# Patient Record
Sex: MALE | Race: WHITE | NOT HISPANIC OR LATINO
[De-identification: names, ages, dates, MRNs, and addresses within clinical notes are randomized per-mention and may not be internally consistent; named-entity substitution may affect disease eponyms.]

---

## 2023-04-28 ENCOUNTER — APPOINTMENT (OUTPATIENT)
Dept: ORTHOPEDIC SURGERY | Facility: CLINIC | Age: 38
End: 2023-04-28

## 2023-05-09 ENCOUNTER — APPOINTMENT (OUTPATIENT)
Dept: ORTHOPEDIC SURGERY | Facility: CLINIC | Age: 38
End: 2023-05-09
Payer: COMMERCIAL

## 2023-05-09 VITALS — HEIGHT: 65 IN | BODY MASS INDEX: 29.99 KG/M2 | WEIGHT: 180 LBS

## 2023-05-09 DIAGNOSIS — Z78.9 OTHER SPECIFIED HEALTH STATUS: ICD-10-CM

## 2023-05-09 PROCEDURE — 20611 DRAIN/INJ JOINT/BURSA W/US: CPT

## 2023-05-09 PROCEDURE — 99204 OFFICE O/P NEW MOD 45 MIN: CPT | Mod: 25

## 2023-05-09 PROCEDURE — 73564 X-RAY EXAM KNEE 4 OR MORE: CPT | Mod: RT

## 2023-05-09 NOTE — DISCUSSION/SUMMARY
[de-identified] : Reviewed all images with patient.\par CSI injections to the right knee was provided today.\par MRI of right knee to eval medial meniscus tear.\par Follow up after MRI scans.\par \par RB&A to corticosteroid injection discussed.\par All questions were answered.\par Patient wishes to move forward with injection today. \par \par Right Knee Ultrasound Guided aspiration/steroid injection procedure note:\par Patient Identification\par Name/: Verbal with patient and/or family\par  \par Procedure Verification:\par Procedure confirmed with patient or family/designee\par Consent for procedure: Verbal Consent Given\par Relevant documentation completed, reviewed, and signed\par Clinical indications for procedure confirmed\par \par Time-out with all members of procedure team immediately prior to procedure:\par Correct patient identified. Agreement on procedure. Correct side and site.\par \par KNEE INTRAARTICULAR INJECTION - RIGHT\par After verbal consent and identification of the correct patient and correct site, the RIGHT superolateral knee was prepped using alcohol swabs and betadine. This was allowed time to air dry.  A mixture of Kenalog,  Bupicacaine  was injected into the right knee using a sterile 22G 1.5 inch needle.  The patient tolerated the procedure well.  A sterile dressing was placed.  After-care instructions were provided and included instructions to ice the area and to call if redness, pain, or fever develop.\par \par -----------------------------------------------\par Home Exercise\par The patient is instructed on a home exercise program.\par \par FRANNY CERVANTES Acting as a Scribe for Dr. Arevalo\par I, Franny Cervantes, attest that this documentation has been prepared under the direction and in the presence of Provider Castro Arevalo MD.\par \par Activity Modification\par The patient was advised to modify their activities.\par \par Dx / Natural History\par The patient was advised of the diagnosis.  The natural history of the pathology was explained in full to the patient in layman's terms.  Several different treatment options were discussed and explained in full to the patient including the risks and benefits of both surgical and non-surgical treatments.  All questions and concerns were answered.\par \par Pain Guide Activities\par The patient was advised to let pain guide the gradual advancement of activities.\par \par RICE\par I explained to the patient that rest, ice, compression, and elevation would benefit them.  They may return to activity after follow-up or when they no longer have any pain.\par \par The patient's current medication management of their orthopedic diagnosis was reviewed today:\par (1) We discussed a comprehensive treatment plan that included possible pharmaceutical management involving the use of prescription strength medications including but not limited to options such as oral Naprosyn 500mg BID, once daily Meloxicam 15 mg, or 500-650 mg Tylenol versus over the counter oral medications and topical prescription NSAID Pennsaid vs over the counter Voltaren gel.\par (2) There is a moderate risk of morbidity with further treatment, especially from use of prescription strength medications and possible side effects of these medications which include upset stomach with oral medications, skin reactions to topical medications and cardiac/renal issues with long term use.\par (3) I recommended that the patient follow-up with their medical physician to discuss any significant specific potential issues with long term medication use such as interactions with current medications or with exacerbation of underlying medical comorbidities.\par (4) The benefits and risks associated with use of injectable, oral or topical, prescription and over the counter anti-inflammatory medications were discussed with the patient. The patient voiced understanding of the risks including but not limited to bleeding, stroke, kidney dysfunction, heart disease, and were referred to the black box warning label for further information. \par

## 2023-05-09 NOTE — PHYSICAL EXAM
[de-identified] : NVI distally\par \par Right Knee:\par Full ROM \par Stable \par no effusion\par Medial joint line tenderness \par

## 2023-05-09 NOTE — HISTORY OF PRESENT ILLNESS
[de-identified] : The patient is a 37 year old R hand dominant male who presents today complaining of right knee.\par Date of Injury/Onset: 1 month ago\par Pain: At Rest: 6-10/10 intermittently\par With Activity: 10/10 intermittently\par Mechanism of injury: no specific injury \par This is not a Work Related Injury being treated under Worker's Compensation.\par This is not an athletic injury occurring associated with an interscholastic or organized sports team.\par Quality of symptoms: aching, sharp\par Improves with: icyhot \par Worse with: driving, extended bending\par Prior treatment: citymd, prednisone, bioflex\par Prior Imaging: n/a\par Out of work/sport: _, since _\par School/Sport/Position/Occupation: [employer] \par Additional Information: None

## 2023-05-13 ENCOUNTER — APPOINTMENT (OUTPATIENT)
Dept: MRI IMAGING | Facility: CLINIC | Age: 38
End: 2023-05-13

## 2023-05-13 DIAGNOSIS — S83.91XA SPRAIN OF UNSPECIFIED SITE OF RIGHT KNEE, INITIAL ENCOUNTER: ICD-10-CM

## 2023-05-14 ENCOUNTER — FORM ENCOUNTER (OUTPATIENT)
Age: 38
End: 2023-05-14

## 2023-05-15 ENCOUNTER — APPOINTMENT (OUTPATIENT)
Dept: MRI IMAGING | Facility: CLINIC | Age: 38
End: 2023-05-15
Payer: COMMERCIAL

## 2023-05-15 PROCEDURE — 73721 MRI JNT OF LWR EXTRE W/O DYE: CPT | Mod: RT

## 2023-05-23 ENCOUNTER — APPOINTMENT (OUTPATIENT)
Dept: ORTHOPEDIC SURGERY | Facility: CLINIC | Age: 38
End: 2023-05-23
Payer: COMMERCIAL

## 2023-05-23 VITALS — BODY MASS INDEX: 29.99 KG/M2 | HEIGHT: 65 IN | WEIGHT: 180 LBS

## 2023-05-23 PROCEDURE — 99214 OFFICE O/P EST MOD 30 MIN: CPT

## 2023-05-23 NOTE — DATA REVIEWED
[FreeTextEntry1] : 05/09/23\par OC X Ray Right knee: 4 views:\par Unremarkable\par \par 5/15/23\par OC MRI Right Knee\par Impression: \par 1. Complex tearing involving the posterior horn and body of the medial meniscus with surrounding synovitis.\par 2. Chronic ligament sprains, mild distal quadriceps tendinitis, effusion, synovitis, small popliteal cyst, and mild \par pes anserine tenosynovitis.\par 3. Partially discoid lateral meniscus without tear.\par 4. Chondral loss in the medial compartment without subchondral edema.\par 5. No acute osseous injury or loose body.

## 2023-05-23 NOTE — PHYSICAL EXAM
[de-identified] : NVI distally\par \par Right Knee:\par Full ROM \par Stable \par no effusion\par Medial joint line tenderness \par

## 2023-05-23 NOTE — HISTORY OF PRESENT ILLNESS
[de-identified] : The patient is a 37 year old R hand dominant male who presents today complaining of right knee.\par Date of Injury/Onset: 1 month ago\par Pain: At Rest: 0/10 \par With Activity: 5/10 intermittently\par Mechanism of injury: no specific injury \par This is not a Work Related Injury being treated under Worker's Compensation.\par This is not an athletic injury occurring associated with an interscholastic or organized sports team.\par Quality of symptoms: aching, sharp\par Improves with: icyhot \par Worse with: driving, extended bending\par Prior treatment: prednisone, bioflex\par Prior Imaging: XR, MRI OC\par Out of work/sport: _, since _\par School/Sport/Position/Occupation: [employer] \par Additional Information: None

## 2023-05-23 NOTE — DISCUSSION/SUMMARY
[de-identified] : Reviewed all images with patient.\par Discussed the option of surgery, the patient would like to follow through with that course of treatment.\par \par \par Surgical Consent:\par \par -Conservative treatment, nontreatment, nonsurgical intervention and surgical intervention treatment options have been reviewed with the patient.  The patient continues to be symptomatic [and has failed conservative treatment], and elects to move forward with surgical intervention.  The patient is indicated for RIGHT KNEE ARTHROSCOPIC PARTIAL MEDIAL MENISCECTOMY, POSSIBLE REPAIR, LIMITED SYNOVECTOMY and all indicated procedures. As such the alternatives, benefits and risks, of the above procedure, including but not limited to bleeding, infection, neurovascular injury, loss of limb, loss of life,  DVT, PE, RSD, inability to return to previous level of activity, inability to return to previous level of employment, advancement of or to osteoarthritic changes, joint instability or motion loss, hardware failure or migration, failure to resolve all symptoms, failure to return to sports and need for further procedures, as well as specific risk of RE-TEAR were discussed with the patient and/or their legal guardian who agreed to move forward with surgical intervention.  They have reviewed and signed the consent form today after expressing understanding of the above documented conversation. The patient or their representative will contact my office as instructed on the preoperative instruction sheet they received today to schedule surgery in a timely manner as discussed.\par \par -As a post-operative protocol, I am prescribing an iceless cold/heat compression therapy device for at home use to be used 3-5 times per day at 40 degrees for 35 days as an alternative to pain medication. I would like my patient to begin with simultaneous cold & compression therapy at 10mm pressure. At the patients follow up I will determine whether they should continue with cold, or if they should transition to contrast cold/heat compression therapy. Unlike a conventional cold therapy unit that requires ice, the ThermX iceless device is set to a prescribed temperature that it will remain throughout the entire duration of use, whether that be cold compression, heat compression, or contrast compression. Cold therapy units that depend on ice melt over a very short period and do not provide compression which limits the compliance and effectiveness for pain/inflammation reduction that I am targeting for my patient. I have reached out to [employer] [facility] to supply this device as they are the exclusive provider of the ThermX and the patient will be contacted and instructed on how to utilize the device.\par ------------\par \par \par -----------------------------------------------\par Home Exercise\par The patient is instructed on a home exercise program.\par \par FRANNY CERVANTES Acting as a Scribe for Dr. Arevalo\par I, Franny Cervantes, attest that this documentation has been prepared under the direction and in the presence of Provider Castro Arevalo MD.\par \par Activity Modification\par The patient was advised to modify their activities.\par \par Dx / Natural History\par The patient was advised of the diagnosis.  The natural history of the pathology was explained in full to the patient in layman's terms.  Several different treatment options were discussed and explained in full to the patient including the risks and benefits of both surgical and non-surgical treatments.  All questions and concerns were answered.\par \par Pain Guide Activities\par The patient was advised to let pain guide the gradual advancement of activities.\par \par RICE\par I explained to the patient that rest, ice, compression, and elevation would benefit them.  They may return to activity after follow-up or when they no longer have any pain.\par \par The patient's current medication management of their orthopedic diagnosis was reviewed today:\par (1) We discussed a comprehensive treatment plan that included possible pharmaceutical management involving the use of prescription strength medications including but not limited to options such as oral Naprosyn 500mg BID, once daily Meloxicam 15 mg, or 500-650 mg Tylenol versus over the counter oral medications and topical prescription NSAID Pennsaid vs over the counter Voltaren gel.\par (2) There is a moderate risk of morbidity with further treatment, especially from use of prescription strength medications and possible side effects of these medications which include upset stomach with oral medications, skin reactions to topical medications and cardiac/renal issues with long term use.\par (3) I recommended that the patient follow-up with their medical physician to discuss any significant specific potential issues with long term medication use such as interactions with current medications or with exacerbation of underlying medical comorbidities.\par (4) The benefits and risks associated with use of injectable, oral or topical, prescription and over the counter anti-inflammatory medications were discussed with the patient. The patient voiced understanding of the risks including but not limited to bleeding, stroke, kidney dysfunction, heart disease, and were referred to the black box warning label for further information.

## 2023-07-13 ENCOUNTER — APPOINTMENT (OUTPATIENT)
Dept: ORTHOPEDIC SURGERY | Facility: CLINIC | Age: 38
End: 2023-07-13
Payer: COMMERCIAL

## 2023-07-13 VITALS — HEIGHT: 65 IN | WEIGHT: 180 LBS | BODY MASS INDEX: 29.99 KG/M2

## 2023-07-13 PROCEDURE — 99213 OFFICE O/P EST LOW 20 MIN: CPT

## 2023-07-13 PROCEDURE — 72170 X-RAY EXAM OF PELVIS: CPT

## 2023-07-13 PROCEDURE — 72100 X-RAY EXAM L-S SPINE 2/3 VWS: CPT

## 2023-07-13 NOTE — PHYSICAL EXAM
[No bony abnormalities] : No bony abnormalities [Straightening consistent with spasm] : Straightening consistent with spasm [AP] : anteroposterior [There are no fractures, subluxations or dislocations. No significant abnormalities are seen] : There are no fractures, subluxations or dislocations. No significant abnormalities are seen [] : no ecchymosis

## 2023-07-13 NOTE — ASSESSMENT
[FreeTextEntry1] : 37M with acute lumbar spasm\par \par MDP rx\par Mobic to start after finishing MDP\par PT/HEP\par f/up in 4-6 weeks if symptoms fail to improve or worsen.

## 2023-07-13 NOTE — DISCUSSION/SUMMARY
[de-identified] : The patient was advised of the diagnosis.  The natural history of the pathology was explained in full to the patient in layman's terms. All questions were answered.  The risks and benefits of surgical and non-surgical treatment alternatives were explained in full to the patient.\par \par

## 2023-07-13 NOTE — HISTORY OF PRESENT ILLNESS
[10] : 10 [Burning] : burning [Shooting] : shooting [Tightness] : tightness [Nothing helps with pain getting better] : Nothing helps with pain getting better [de-identified] : 7/13/23: 38yo M with right hip/buttock pain for the past 3 days with no injury. Admits to down the posterior right leg to the knee with some burning sensation. Pain radiates across the back. Denies weakness, b/b incontinence. Tried Advil, Tylenol, epsom salt bath with little relief. Denies prior back/hip issues.  [] : no [FreeTextEntry1] : R hip  [FreeTextEntry3] : 3 days ago  [FreeTextEntry5] : pt states no injury has occurred  [FreeTextEntry7] : hamstring

## 2023-08-07 ENCOUNTER — APPOINTMENT (OUTPATIENT)
Dept: ORTHOPEDIC SURGERY | Facility: CLINIC | Age: 38
End: 2023-08-07

## 2023-08-07 ENCOUNTER — APPOINTMENT (OUTPATIENT)
Dept: ORTHOPEDIC SURGERY | Facility: CLINIC | Age: 38
End: 2023-08-07
Payer: COMMERCIAL

## 2023-08-07 VITALS — BODY MASS INDEX: 30.82 KG/M2 | WEIGHT: 185 LBS | HEIGHT: 65 IN

## 2023-08-07 DIAGNOSIS — M62.830 MUSCLE SPASM OF BACK: ICD-10-CM

## 2023-08-07 PROCEDURE — 99214 OFFICE O/P EST MOD 30 MIN: CPT

## 2023-08-07 RX ORDER — MELOXICAM 15 MG/1
15 TABLET ORAL DAILY
Qty: 30 | Refills: 1 | Status: ACTIVE | COMMUNITY
Start: 2023-08-07 | End: 1900-01-01

## 2023-08-11 ENCOUNTER — APPOINTMENT (OUTPATIENT)
Dept: MRI IMAGING | Facility: CLINIC | Age: 38
End: 2023-08-11
Payer: COMMERCIAL

## 2023-08-11 PROCEDURE — 72148 MRI LUMBAR SPINE W/O DYE: CPT

## 2023-08-15 ENCOUNTER — APPOINTMENT (OUTPATIENT)
Dept: ORTHOPEDIC SURGERY | Facility: CLINIC | Age: 38
End: 2023-08-15
Payer: COMMERCIAL

## 2023-08-15 VITALS — BODY MASS INDEX: 30.82 KG/M2 | WEIGHT: 185 LBS | HEIGHT: 65 IN

## 2023-08-15 PROBLEM — M62.830 LUMBAR PARASPINAL MUSCLE SPASM: Status: ACTIVE | Noted: 2023-07-13

## 2023-08-15 PROCEDURE — 99214 OFFICE O/P EST MOD 30 MIN: CPT

## 2023-08-15 NOTE — DATA REVIEWED
[I independently reviewed and interpreted images and report] : I independently reviewed and interpreted images and report [I reviewed the films/CD and additionally noted] : I reviewed the films/CD and additionally noted [FreeTextEntry2] : OC XRAY July 13 2023 pelvis - normal. no fx [FreeTextEntry1] : I stop paperwork reviewed

## 2023-08-15 NOTE — HISTORY OF PRESENT ILLNESS
[7] : 7 [Dull/Aching] : dull/aching [Radiating] : radiating [Constant] : constant [Meds] : meds [de-identified] : 08/07/2023 - 36 y/o M presenting for initial evaluation of lumbar spine. Chief complaint of focal RT low back pain radiating into RT buttock / lateral leg starting x 1.5 month ago. No trauma or mechanism of injury. Sought care at [facility] where he presents with imaging of lumbar and pelvis. Prescribed MDP afforded mild improvement toward the end of the steroid taper. Present day he is treating with Meloxicam PRN and hot showers. Severeity of pain ranges from tolerable to significant. No abnormalities are reported in regard to general medical health.  [] : no [FreeTextEntry1] : l-spine [FreeTextEntry5] :  Pt. is a 37 year y/o M who presents for l-spine pain. Pt states pain has been ongoing for around 1 month. Pt does not recall specific trauma.  [FreeTextEntry7] : R leg

## 2023-08-15 NOTE — DISCUSSION/SUMMARY
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 MRI reviewed today reveals small right paracentral herniation, L5-S1 broad based herniation, L4-5 right extra foraminal disc herniation L3-4 large impinging right L3 nerve root. Although L3-4 far lateral hnp is large, symptoms appear to ne mostly in a L5 nerve root distribution, likely from hnp at L4-5. I am referring the patient to pain management for lumbar spine injections. Discussed possible interventional spine injections vs surgical decompression dependent his response to conservative treatments. Continue treatment with Meloxicam as directed, renewal provided. Continue home- based exercises. F/U in   Prior to appointment and during encounter with patient extensive medical records were reviewed including but not limited to, hospital records, outpatient records, imaging results, and lab data.During this appointment the patient was examined, diagnoses were discussed and explained in a face to face manner. In addition extensive time was spent reviewing aforementioned diagnostic studies. Counseling including abnormal image results, differential diagnoses, treatment options, risk and benefits, lifestyle changes, current condition, and current medications was performed. Patient's comments, questions, and concerns were addressed and patient verbalized understanding. Based on this patient's presentation at our office, which is an orthopedic spine surgeon's office, this patient inherently / intrinsically has a risk, however minute, of developing issues such as Cauda equina syndrome, bowel and bladder changes, or progression of motor or neurological deficits such as paralysis which may be permanent.   YSABEL BATISTA Acting as a Scribe for Dr. Lucio CAMPOS, Ysabel Batista, attest that this documentation has been prepared under the direction and in the presence of Provider Raúl Valdes MD.

## 2023-08-15 NOTE — HISTORY OF PRESENT ILLNESS
[5] : 5 [Dull/Aching] : dull/aching [Sharp] : sharp [Stabbing] : stabbing [Throbbing] : throbbing [Tightness] : tightness [Tingling] : tingling [] : yes [Full time] : Work status: full time [de-identified] : 08/15/2023: Patient presenting today for MRI results review. Similar symptoms to last visit. Back pain with right sided buttock pain, intermittent radicular symptoms in RLE. Pain to calf is primarily in the side, not anterior. Treated with meloxicam and states relief. Complaint with HEP.  08/07/2023 - 36 y/o M presenting for initial evaluation of lumbar spine. Chief complaint of focal RT low back pain radiating into RT buttock / lateral leg starting x 1.5 month ago. No trauma or mechanism of injury. Sought care at [facility] where he presents with imaging of lumbar and pelvis. Prescribed MDP afforded mild improvement toward the end of the steroid taper. Present day he is treating with Meloxicam PRN and hot showers. Severeity of pain ranges from tolerable to significant. No abnormalities are reported in regard to general medical health.  [FreeTextEntry1] : L spine  [FreeTextEntry7] : R hip/ buttock  [de-identified] : MRI  [de-identified] : construction

## 2023-08-15 NOTE — DISCUSSION/SUMMARY
[de-identified] : 36 y/o M with lumbar radicuolpatrhy [facility] xrays of lumbar and pelvis reviewed with the patient are normal. I am requesting a lumbar spine MRI to evaluate for hnp, patient experiencing persistent RT sided low back pain/radic refractory to MDP, Meloxicam > 6 weeks duration. He may be a candidate for interventional pain management in terms of epidural steroid injections although this will be determined upon review of the MRI. Continue treatment with Meloxicam as directed, renewal provided. FUV after MRI.   Prior to appointment and during encounter with patient extensive medical records were reviewed including but not limited to, hospital records, outpatient records, imaging results, and lab data.During this appointment the patient was examined, diagnoses were discussed and explained in a face to face manner. In addition extensive time was spent reviewing aforementioned diagnostic studies. Counseling including abnormal image results, differential diagnoses, treatment options, risk and benefits, lifestyle changes, current condition, and current medications was performed. Patient's comments, questions, and concerns were addressed and patient verbalized understanding. Based on this patient's presentation at our office, which is an orthopedic spine surgeon's office, this patient inherently / intrinsically has a risk, however minute, of developing issues such as Cauda equina syndrome, bowel and bladder changes, or progression of motor or neurological deficits such as paralysis which may be permanent.  SYDNI DE LA GARZA Acting as a Scribe for Dr. Lucio CAMPOS, Sydni De La Garza, attest that this documentation has been prepared under the direction and in the presence of Provider Raúl Valdes MD.

## 2023-08-15 NOTE — PHYSICAL EXAM
[Normal Coordination] : normal coordination [Normal DTR UE/LE] : normal DTR UE/LE  [Normal Sensation] : normal sensation [Normal Mood and Affect] : normal mood and affect [Orientated] : orientated [Able to Communicate] : able to communicate [Normal Skin] : normal skin [No Rash] : no rash [No Ulcers] : no ulcers [No Lesions] : no lesions [No obvious lymphadenopathy in areas examined] : no obvious lymphadenopathy in areas examined [Well Developed] : well developed [Peripheral vascular exam is grossly normal] : peripheral vascular exam is grossly normal [No Respiratory Distress] : no respiratory distress [Lungs clear to auscultation bilaterally] : lungs clear to auscultation bilaterally [Normal Bowel Sounds] : normal bowel sounds [Non-Tender] : non-tender [No HSM] : no HSM [No Mass] : no mass [Flexion] : flexion [Extension] : extension [] : non-antalgic

## 2023-09-14 ENCOUNTER — APPOINTMENT (OUTPATIENT)
Dept: PAIN MANAGEMENT | Facility: CLINIC | Age: 38
End: 2023-09-14
Payer: COMMERCIAL

## 2023-09-14 VITALS — HEIGHT: 65 IN | WEIGHT: 185 LBS | BODY MASS INDEX: 30.82 KG/M2

## 2023-09-14 PROCEDURE — 99204 OFFICE O/P NEW MOD 45 MIN: CPT

## 2023-09-14 RX ORDER — METHYLPREDNISOLONE 4 MG/1
4 TABLET ORAL
Qty: 1 | Refills: 2 | Status: DISCONTINUED | COMMUNITY
Start: 2023-07-13 | End: 2023-09-14

## 2023-09-14 RX ORDER — MELOXICAM 15 MG/1
15 TABLET ORAL
Qty: 30 | Refills: 0 | Status: DISCONTINUED | COMMUNITY
Start: 2023-07-13 | End: 2023-09-14

## 2023-09-18 ENCOUNTER — APPOINTMENT (OUTPATIENT)
Dept: ORTHOPEDIC SURGERY | Facility: CLINIC | Age: 38
End: 2023-09-18
Payer: COMMERCIAL

## 2023-09-18 VITALS — BODY MASS INDEX: 30.82 KG/M2 | HEIGHT: 65 IN | WEIGHT: 185 LBS

## 2023-09-18 DIAGNOSIS — M54.16 RADICULOPATHY, LUMBAR REGION: ICD-10-CM

## 2023-09-18 DIAGNOSIS — M51.26 OTHER INTERVERTEBRAL DISC DISPLACEMENT, LUMBAR REGION: ICD-10-CM

## 2023-09-18 PROCEDURE — 99214 OFFICE O/P EST MOD 30 MIN: CPT

## 2023-09-29 PROBLEM — M51.26 LUMBAR DISC HERNIATION: Status: ACTIVE | Noted: 2023-08-15

## 2023-09-29 PROBLEM — M54.16 LUMBAR RADICULOPATHY: Status: ACTIVE | Noted: 2023-08-15

## 2023-10-16 ENCOUNTER — APPOINTMENT (OUTPATIENT)
Dept: ORTHOPEDIC SURGERY | Facility: CLINIC | Age: 38
End: 2023-10-16

## 2025-04-26 ENCOUNTER — APPOINTMENT (OUTPATIENT)
Dept: PHYSICAL MEDICINE AND REHAB | Facility: CLINIC | Age: 40
End: 2025-04-26

## 2025-04-26 VITALS — WEIGHT: 185 LBS | HEIGHT: 65 IN | BODY MASS INDEX: 30.82 KG/M2

## 2025-04-26 DIAGNOSIS — M72.2 PLANTAR FASCIAL FIBROMATOSIS: ICD-10-CM

## 2025-04-26 PROCEDURE — 99214 OFFICE O/P EST MOD 30 MIN: CPT

## 2025-04-26 PROCEDURE — 73630 X-RAY EXAM OF FOOT: CPT | Mod: RT

## 2025-04-26 RX ORDER — METHYLPREDNISOLONE 4 MG/1
4 TABLET ORAL
Qty: 1 | Refills: 0 | Status: ACTIVE | COMMUNITY
Start: 2025-04-26 | End: 1900-01-01

## 2025-05-01 ENCOUNTER — APPOINTMENT (OUTPATIENT)
Dept: ORTHOPEDIC SURGERY | Facility: CLINIC | Age: 40
End: 2025-05-01